# Patient Record
Sex: MALE | Race: WHITE | NOT HISPANIC OR LATINO | Employment: FULL TIME | URBAN - METROPOLITAN AREA
[De-identification: names, ages, dates, MRNs, and addresses within clinical notes are randomized per-mention and may not be internally consistent; named-entity substitution may affect disease eponyms.]

---

## 2017-02-27 ENCOUNTER — OFFICE VISIT (OUTPATIENT)
Dept: FAMILY MEDICINE CLINIC | Facility: CLINIC | Age: 42
End: 2017-02-27

## 2017-02-27 VITALS
HEIGHT: 74 IN | SYSTOLIC BLOOD PRESSURE: 130 MMHG | BODY MASS INDEX: 36.7 KG/M2 | HEART RATE: 81 BPM | WEIGHT: 286 LBS | TEMPERATURE: 98 F | OXYGEN SATURATION: 95 % | DIASTOLIC BLOOD PRESSURE: 90 MMHG

## 2017-02-27 DIAGNOSIS — J40 BRONCHITIS: Primary | ICD-10-CM

## 2017-02-27 PROCEDURE — 99213 OFFICE O/P EST LOW 20 MIN: CPT | Performed by: NURSE PRACTITIONER

## 2017-02-27 RX ORDER — AZITHROMYCIN 250 MG/1
TABLET, FILM COATED ORAL
Qty: 6 TABLET | Refills: 0 | Status: SHIPPED | OUTPATIENT
Start: 2017-02-27 | End: 2017-03-15

## 2017-02-27 RX ORDER — CARVEDILOL 6.25 MG/1
6.25 TABLET ORAL 2 TIMES DAILY WITH MEALS
Qty: 180 TABLET | Refills: 1 | Status: SHIPPED | OUTPATIENT
Start: 2017-02-27 | End: 2017-10-05 | Stop reason: SDUPTHER

## 2017-02-27 NOTE — PROGRESS NOTES
"Subjective   Griffin Samuels is a 42 y.o. male who is here with mostly dry cough and chest congestion. Patient would also like to know if you will refill his blood pressure medicine. He has been out for 3 days now and Dr. Tracy hasn't refilled it for some reason.     History of Present Illness   Congestion and SOA worsening for last 3-4 days, had fever a few nights ago, none since. currently taking otc allergy medication, not helping much. Has not needed inhaler.   Last saw Dr. Tracy several months ago, needs to switch prescriptions to this office. Last blood work 2 weeks ago at Dr. Ramires's office. For testosterone.   The following portions of the patient's history were reviewed and updated as appropriate: allergies, current medications, past family history, past medical history, past social history, past surgical history and problem list.    Review of Systems   Constitutional: Negative for chills and fever.   HENT: Positive for congestion, rhinorrhea, sore throat and voice change. Negative for ear discharge, ear pain, facial swelling, hearing loss, nosebleeds, sinus pressure, sneezing and trouble swallowing.    Respiratory: Positive for cough and shortness of breath. Negative for wheezing.    Cardiovascular: Negative.  Negative for chest pain.   Gastrointestinal: Negative for abdominal pain.   Endocrine: Negative.    Musculoskeletal: Negative for arthralgias.   Allergic/Immunologic: Negative for environmental allergies.   Neurological: Negative for headaches.     Visit Vitals   • /90 (BP Location: Left arm, Patient Position: Sitting, Cuff Size: Large Adult)   • Pulse 81   • Temp 98 °F (36.7 °C) (Oral)   • Ht 74\" (188 cm)   • Wt 286 lb (130 kg)   • SpO2 95%   • BMI 36.72 kg/m2     Objective   Physical Exam   Constitutional: He appears well-developed and well-nourished. No distress.   HENT:   Head: Normocephalic.   Nose: No mucosal edema or rhinorrhea.   Mouth/Throat: Posterior oropharyngeal erythema " present. No oropharyngeal exudate.   Cardiovascular: Normal rate, regular rhythm, normal heart sounds and intact distal pulses.    Pulmonary/Chest: Effort normal and breath sounds normal. No respiratory distress. He has no wheezes. He has no rales.   Lymphadenopathy:     He has cervical adenopathy.   Skin: He is not diaphoretic.   Psychiatric: He has a normal mood and affect. Judgment and thought content normal.   Nursing note and vitals reviewed.    Assessment/Plan   Problems Addressed this Visit     None      Visit Diagnoses     Bronchitis    -  Primary    Relevant Medications    azithromycin (ZITHROMAX) 250 MG tablet        Continue allergy symptom control. FU if not settling 1 week. Reviewed labs from 2016 from leroy Ramires for refill on carvedilol.

## 2017-03-15 ENCOUNTER — OFFICE VISIT (OUTPATIENT)
Dept: FAMILY MEDICINE CLINIC | Facility: CLINIC | Age: 42
End: 2017-03-15

## 2017-03-15 VITALS
TEMPERATURE: 98.1 F | HEART RATE: 97 BPM | BODY MASS INDEX: 36.32 KG/M2 | OXYGEN SATURATION: 95 % | SYSTOLIC BLOOD PRESSURE: 118 MMHG | HEIGHT: 74 IN | WEIGHT: 283 LBS | DIASTOLIC BLOOD PRESSURE: 84 MMHG

## 2017-03-15 DIAGNOSIS — Z87.2 H/O HIDRADENITIS SUPPURATIVA: ICD-10-CM

## 2017-03-15 DIAGNOSIS — IMO0002: Primary | ICD-10-CM

## 2017-03-15 PROCEDURE — 99213 OFFICE O/P EST LOW 20 MIN: CPT | Performed by: NURSE PRACTITIONER

## 2017-03-15 RX ORDER — SULFAMETHOXAZOLE AND TRIMETHOPRIM 800; 160 MG/1; MG/1
1 TABLET ORAL 2 TIMES DAILY
Qty: 14 TABLET | Refills: 0 | Status: SHIPPED | OUTPATIENT
Start: 2017-03-15 | End: 2018-01-10

## 2017-03-15 NOTE — PROGRESS NOTES
"Subjective   Griffin Samuels is a 42 y.o. male who presents with knot on inner right thigh x 6 days. Also with a cough x 3 weeks. Was treated for bronchitis last month.     History of Present Illness   Cough is better overall, unsure if fever other day associated with leg infection or not. Leg has had knot that was larger than golf ball size that drained at noon today. Has had 2 similar knots in the past both in axillas that healed on own  The following portions of the patient's history were reviewed and updated as appropriate: allergies, current medications, past family history, past medical history, past social history, past surgical history and problem list.    Review of Systems   Constitutional: Positive for fever. Negative for chills, fatigue and unexpected weight change.   HENT: Negative.  Negative for congestion.    Respiratory: Positive for cough.    Cardiovascular: Negative.    Gastrointestinal: Negative.    Skin: Positive for wound.   Hematological: Negative.    Psychiatric/Behavioral: Negative.      Visit Vitals   • /84   • Pulse 97   • Temp 98.1 °F (36.7 °C) (Oral)   • Ht 74\" (188 cm)   • Wt 283 lb (128 kg)   • SpO2 95%   • BMI 36.34 kg/m2     Objective   Physical Exam   Constitutional: He appears well-developed and well-nourished.   Lymphadenopathy:        Right: Inguinal adenopathy present.   Skin: Skin is warm. There is erythema.   R inner thigh with 10 x 6 cm cellulitis, indurated, central punctate with minimal serosang discharge.    Psychiatric: He has a normal mood and affect. His behavior is normal. Judgment and thought content normal.   Nursing note and vitals reviewed.    Assessment/Plan   Problems Addressed this Visit     None      Visit Diagnoses     Abscess or cellulitis of leg    -  Primary    Relevant Medications    sulfamethoxazole-trimethoprim (BACTRIM DS,SEPTRA DS) 800-160 MG per tablet    H/O hidradenitis suppurativa              Treated 2/27/17 for bronchitis, now with active " cellulitis, separate issue. Follow up if not settling 1 week, or ASAP any worsening. Consider topical if recurrent cysts

## 2017-10-06 RX ORDER — CARVEDILOL 6.25 MG/1
TABLET ORAL
Qty: 180 TABLET | Refills: 0 | Status: SHIPPED | OUTPATIENT
Start: 2017-10-06 | End: 2018-07-30 | Stop reason: SDUPTHER

## 2017-10-29 DIAGNOSIS — J45.990 EXERCISE-INDUCED ASTHMA: ICD-10-CM

## 2017-11-01 RX ORDER — ALBUTEROL SULFATE 90 MCG
HFA AEROSOL WITH ADAPTER (GRAM) INHALATION
Qty: 6.7 G | Refills: 0 | OUTPATIENT
Start: 2017-11-01

## 2018-01-10 ENCOUNTER — OFFICE VISIT (OUTPATIENT)
Dept: FAMILY MEDICINE CLINIC | Facility: CLINIC | Age: 43
End: 2018-01-10

## 2018-01-10 VITALS
HEART RATE: 77 BPM | DIASTOLIC BLOOD PRESSURE: 72 MMHG | SYSTOLIC BLOOD PRESSURE: 132 MMHG | TEMPERATURE: 98 F | BODY MASS INDEX: 37.47 KG/M2 | OXYGEN SATURATION: 96 % | HEIGHT: 74 IN | WEIGHT: 292 LBS

## 2018-01-10 DIAGNOSIS — Z00.00 ROUTINE GENERAL MEDICAL EXAMINATION AT A HEALTH CARE FACILITY: ICD-10-CM

## 2018-01-10 DIAGNOSIS — R42 VERTIGO: ICD-10-CM

## 2018-01-10 DIAGNOSIS — H66.001 ACUTE SUPPURATIVE OTITIS MEDIA OF RIGHT EAR WITHOUT SPONTANEOUS RUPTURE OF TYMPANIC MEMBRANE, RECURRENCE NOT SPECIFIED: Primary | ICD-10-CM

## 2018-01-10 PROCEDURE — 99213 OFFICE O/P EST LOW 20 MIN: CPT | Performed by: NURSE PRACTITIONER

## 2018-01-10 RX ORDER — OXYCODONE HCL 40 MG/1
30 TABLET, FILM COATED, EXTENDED RELEASE ORAL DAILY
COMMUNITY
Start: 2018-01-02 | End: 2018-07-30 | Stop reason: DRUGHIGH

## 2018-01-10 RX ORDER — CEFDINIR 300 MG/1
CAPSULE ORAL
Qty: 20 CAPSULE | Refills: 0 | Status: SHIPPED | OUTPATIENT
Start: 2018-01-10 | End: 2018-07-26

## 2018-01-10 RX ORDER — MECLIZINE HYDROCHLORIDE 25 MG/1
25 TABLET ORAL 3 TIMES DAILY PRN
Qty: 30 TABLET | Refills: 2 | Status: SHIPPED | OUTPATIENT
Start: 2018-01-10 | End: 2018-07-30

## 2018-01-10 NOTE — PROGRESS NOTES
Subjective   Griffin Samuels is a 43 y.o. male. He had an ear infection last week, he went to the [facility]. He was treated with an antibiotic and that seemed to get better. The last 4 days he is having vertigo. He looked it up online and tried the maneuver for vertigo but that made it much worse.  He is nauseated when he gets the vertigo. He denies sinus problems other than the recent ear infection.  He has not had his labs done in a couple of years so is due for that as well.     Dizziness   This is a new problem. The current episode started in the past 7 days. The problem occurs intermittently. The problem has been gradually worsening. Pertinent negatives include no headaches or visual change. Exacerbated by: change of position. Treatments tried: antibiotic. The treatment provided mild relief.        The following portions of the patient's history were reviewed and updated as appropriate: allergies, current medications, past medical history, past social history, past surgical history and problem list.    Review of Systems   Constitutional: Negative.    HENT:        Recently treated for OM   Respiratory: Negative.    Cardiovascular: Negative.    Neurological: Positive for dizziness and light-headedness. Negative for headaches.       Objective   Physical Exam   Constitutional: Vital signs are normal. He appears well-developed and well-nourished. He is cooperative.   HENT:   Right Ear: Hearing normal. Tympanic membrane is erythematous. A middle ear effusion is present.   Left Ear: Hearing and ear canal normal.   Nose: Nose normal.   Mouth/Throat: Uvula is midline, oropharynx is clear and moist and mucous membranes are normal.   Cardiovascular: Normal rate, regular rhythm and normal heart sounds.    Pulmonary/Chest: Effort normal and breath sounds normal.   Neurological: He is alert.   Nursing note and vitals reviewed.    Vitals:    01/10/18 1447   BP: 132/72   Pulse: 77   Temp: 98 °F (36.7 °C)   TempSrc: Oral  "  SpO2: 96%   Weight: 132 kg (292 lb)   Height: 188 cm (74.02\")       Assessment/Plan   Diagnoses and all orders for this visit:    Acute suppurative otitis media of right ear without spontaneous rupture of tympanic membrane, recurrence not specified  -     cefdinir (OMNICEF) 300 MG capsule; Take one capsule bid x 10 days    Routine general medical examination at a health care facility  -     CBC Auto Differential  -     Comprehensive Metabolic Panel  -     Hemoglobin A1c  -     Lipid Panel  -     TSH    Vertigo  -     meclizine (ANTIVERT) 25 MG tablet; Take 1 tablet by mouth 3 (Three) Times a Day As Needed for dizziness.    RTC if not improved  Use antihistamine          "

## 2018-07-10 ENCOUNTER — TRANSCRIBE ORDERS (OUTPATIENT)
Dept: PHYSICAL THERAPY | Facility: CLINIC | Age: 43
End: 2018-07-10

## 2018-07-10 DIAGNOSIS — M25.562 LEFT KNEE PAIN, UNSPECIFIED CHRONICITY: Primary | ICD-10-CM

## 2018-07-17 ENCOUNTER — TREATMENT (OUTPATIENT)
Dept: PHYSICAL THERAPY | Facility: CLINIC | Age: 43
End: 2018-07-17

## 2018-07-17 DIAGNOSIS — M25.562 ACUTE PAIN OF LEFT KNEE: Primary | ICD-10-CM

## 2018-07-17 PROCEDURE — 97110 THERAPEUTIC EXERCISES: CPT | Performed by: PHYSICAL THERAPIST

## 2018-07-17 PROCEDURE — 97001 PR PHYS THERAPY EVALUATION: CPT | Performed by: PHYSICAL THERAPIST

## 2018-07-17 PROCEDURE — 97014 ELECTRIC STIMULATION THERAPY: CPT | Performed by: PHYSICAL THERAPIST

## 2018-07-17 NOTE — PROGRESS NOTES
Physical Therapy Initial Evaluation and Plan of Care        Subjective Evaluation    History of Present Illness  Mechanism of injury: Patient reports that he was pulling a can and twisted his knee.  He reports that this knee continues to get worse and is swelling more and more.  Is having a a hard time putting his full weight on his left LE and uses crutches when having to walk any significant distance (patient arrived without crutches however reports that if it was any further than the 100 feet he would have brought them in).  He is having trouble sleeping and has been attempting to ice as often as possible.    MRI is already scheduled      Patient Occupation: [employer] Quality of life: excellent    Pain  Quality: throbbing, squeezing and pulling  Relieving factors: medications and ice  Aggravating factors: squatting, ambulation, stairs, prolonged positioning, repetitive movement, sleeping, standing and movement  Progression: worsening    Treatments  Previous treatment: medication  Patient Goals  Patient goals for therapy: return to work, return to sport/leisure activities, increased strength, decreased edema, decreased pain and increased motion             Objective       Tenderness   Left Knee   Tenderness in the patellar tendon and quadriceps tendon.     Active Range of Motion   Left Knee   Flexion: 50 degrees   Extension: 0 degrees with pain    Additional Active Range of Motion Details  *Measurements taken in supine.     Tests     Additional Tests Details  Special tests no performed secondary to pain.          Assessment & Plan     Assessment  Impairments: abnormal or restricted ROM, activity intolerance, lacks appropriate home exercise program and pain with function  Assessment details: Patient is a 43 year old male who present with increased left knee pain.  Upon examination he has significant limitation in left knee AROM. MMT and special testing were not performed secondary to pain.  Increased tenderness at  patellar and quad tendon.    Prognosis: good  Prognosis details: Long Term Goals (4 weeks)    Patient is able to return to work/community activities with minimal to no discomfort  Patient is able to lift 45 lbs from floor to waist  Patient is able stand for as long as needed for work/community related tasks.   Patient is able to sit for as long as needed for work/community related tasks.   Patient is able to reciprocally climb steps as needed for daily tasks.          Short Term Goals 2 weeks    Patient is independent with HEP  Patient is able to sleep without being disrupted by pain.   Patient has full AROM/PROM of left knee  Patient has greater than 50% improvement overall.                 Functional Limitations: sleeping, walking and standing  Plan  Therapy options: will be seen for skilled physical therapy services  Planned modality interventions: TENS, ultrasound, thermotherapy (hydrocollator packs) and cryotherapy  Planned therapy interventions: manual therapy, soft tissue mobilization, strengthening, stretching, therapeutic activities, joint mobilization, home exercise program, functional ROM exercises, flexibility and body mechanics training  Treatment plan discussed with: patient          Therapeutic Exercise:    25     mins  [id number];       Timed Treatment:   25   mins   Total Treatment:     55   mins    PT SIGNATURE: Tracy Storm, PT   DATE TREATMENT INITIATED: 7/17/2018    Initial Certification  Certification Period: 10/15/2018  I certify that the therapy services are furnished while this patient is under my care.  The services outlined above are required by this patient, and will be reviewed every 90 days.     PHYSICIAN: Araceli Gray, VERA      DATE:     Please sign and return via fax to [phone].. Thank you, [facility].

## 2018-07-19 ENCOUNTER — TREATMENT (OUTPATIENT)
Dept: PHYSICAL THERAPY | Facility: CLINIC | Age: 43
End: 2018-07-19

## 2018-07-19 DIAGNOSIS — M25.562 ACUTE PAIN OF LEFT KNEE: Primary | ICD-10-CM

## 2018-07-19 PROCEDURE — 97110 THERAPEUTIC EXERCISES: CPT | Performed by: PHYSICAL THERAPIST

## 2018-07-19 PROCEDURE — 97014 ELECTRIC STIMULATION THERAPY: CPT | Performed by: PHYSICAL THERAPIST

## 2018-07-19 NOTE — PROGRESS NOTES
Re-Assessment / Re-Certification        Patient: Griffin Samuels   : 1975  Diagnosis/ICD-10 Code:  Acute pain of left knee [M25.562]  Referring practitioner: Araceli Gray,*  Patient seen for 2 sessions      Subjective:   Griffin Samuels reports: Patient reports that he continues to have increased left knee pain and swelling.  States that the exercises have helped with the calf cramping however otherwise minimal to no results.     Home Program Compliance: Yes  Treatment has included: therapeutic exercise and electrical stimulation    Subjective Evaluation    Pain  Aggravating factors: squatting, ambulation, prolonged positioning, repetitive movement, sleeping, movement, stairs and standing  Progression: worsening    Patient Goals  Patient goals for therapy: return to work, return to sport/leisure activities, increased strength, decreased edema, decreased pain and increased motion         Objective       Tenderness   Left Knee   Tenderness in the patellar tendon and quadriceps tendon.     Active Range of Motion   Left Knee   Flexion: 50 degrees   Extension: 0 degrees with pain    Additional Active Range of Motion Details  *Measurements taken in supine.     Tests     Additional Tests Details  Special tests no performed secondary to pain.      Assessment & Plan     Assessment  Assessment details: Patient has attended 2 physical therapy appointments thus far.  He has had minimal progress and continues to have significantly limited AROM in both sitting and supine (approximatly 50 degrees).  Does have full extension however painful.  Patient has pain inhibited MMT.  Special testing not tested due to limited AROM.  Tenderness to palpation along medial joint line and medial inferior to patella with mild swelling noted.  Patient gait is compromised with limited stance time.     Plan  Plan details: Please advise.       Progress toward previous goals: Not Met      PT Signature: Tracy Storm, PT      Based  upon review of the patient's progress and continued therapy plan, it is my medical opinion that Griffin Samuels should continue physical therapy treatment at [facility]  [address] [place] [id number] [phone].    Signature: __________________________________  VERA Stanley      Therapeutic Exercise:    12     mins  [id number];       Timed Treatment:   12   mins   Total Treatment:     27   mins

## 2018-07-24 ENCOUNTER — TREATMENT (OUTPATIENT)
Dept: PHYSICAL THERAPY | Facility: CLINIC | Age: 43
End: 2018-07-24

## 2018-07-24 DIAGNOSIS — M25.562 ACUTE PAIN OF LEFT KNEE: Primary | ICD-10-CM

## 2018-07-24 PROCEDURE — 97014 ELECTRIC STIMULATION THERAPY: CPT | Performed by: PHYSICAL THERAPIST

## 2018-07-24 PROCEDURE — 97110 THERAPEUTIC EXERCISES: CPT | Performed by: PHYSICAL THERAPIST

## 2018-07-24 PROCEDURE — 97035 APP MDLTY 1+ULTRASOUND EA 15: CPT | Performed by: PHYSICAL THERAPIST

## 2018-07-24 NOTE — PROGRESS NOTES
Physical Therapy Daily Progress Note            Subjective Evaluation    History of Present Illness    Subjective comment: Patient reports that the pain continues to intensify and is not wrapping around the knee.  Still waiting to hear about his orthopedic appointment.        Objective   See Exercise, Manual, and Modality Logs for complete treatment.       Assessment & Plan     Assessment  Assessment details: Patient continues to have considerable pain with low level exercises. ROM significantly limited.                        Therapeutic Exercise:    15     mins  [id number];     Ultrasound:     8     mins  [id number];      Timed Treatment:   23   mins   Total Treatment:     38   mins    Tracy Storm, PT  Physical Therapist

## 2018-07-26 ENCOUNTER — HOSPITAL ENCOUNTER (EMERGENCY)
Facility: HOSPITAL | Age: 43
Discharge: HOME | End: 2018-07-26
Attending: EMERGENCY MEDICINE | Admitting: EMERGENCY MEDICINE

## 2018-07-26 ENCOUNTER — TREATMENT (OUTPATIENT)
Dept: PHYSICAL THERAPY | Facility: CLINIC | Age: 43
End: 2018-07-26

## 2018-07-26 VITALS
BODY MASS INDEX: 33.37 KG/M2 | TEMPERATURE: 98.1 F | HEIGHT: 74 IN | HEART RATE: 87 BPM | DIASTOLIC BLOOD PRESSURE: 95 MMHG | SYSTOLIC BLOOD PRESSURE: 142 MMHG | WEIGHT: 260 LBS | OXYGEN SATURATION: 98 % | RESPIRATION RATE: 16 BRPM

## 2018-07-26 DIAGNOSIS — M25.562 ACUTE PAIN OF LEFT KNEE: Primary | ICD-10-CM

## 2018-07-26 DIAGNOSIS — S83.92XA SPRAIN OF LEFT KNEE, UNSPECIFIED LIGAMENT, INITIAL ENCOUNTER: Primary | ICD-10-CM

## 2018-07-26 PROCEDURE — 97110 THERAPEUTIC EXERCISES: CPT | Performed by: PHYSICAL THERAPIST

## 2018-07-26 PROCEDURE — 99282 EMERGENCY DEPT VISIT SF MDM: CPT

## 2018-07-26 PROCEDURE — 97014 ELECTRIC STIMULATION THERAPY: CPT | Performed by: PHYSICAL THERAPIST

## 2018-07-26 NOTE — PROGRESS NOTES
Physical Therapy Daily Progress Note            Subjective Evaluation    History of Present Illness    Subjective comment: Patient reports that he is having signifiant right hip pain from having to walk the distance needed from the parking lot to his station at work.  He states that his knee is about the same.        Objective   See Exercise, Manual, and Modality Logs for complete treatment.       Assessment & Plan     Assessment  Assessment details: Patient presented to the clinic with an antalgic gait pattern which is progressively worse then previous visits.  He has limited weight bearing on left LE with right leaning. Introduced moist heat to low back/right hip as well as SKTC and piriformis for increased muscle guarding. Patient tolerated fairly well with some decrease in pain.  Discussed circumstances with referring provider  who suggested that if his pain is elevated that he should proceed to ER following PT where more diagnostics/medication could be administered.   Referral to orthopedics is still pending.                              Therapeutic Exercise:    15     mins  [id number];       Timed Treatment:   15   mins   Total Treatment:     30   mins    Tracy Storm, PT  Physical Therapist

## 2018-07-30 ENCOUNTER — TELEPHONE (OUTPATIENT)
Dept: SOCIAL WORK | Facility: HOSPITAL | Age: 43
End: 2018-07-30

## 2018-07-30 ENCOUNTER — OFFICE VISIT (OUTPATIENT)
Dept: FAMILY MEDICINE CLINIC | Facility: CLINIC | Age: 43
End: 2018-07-30

## 2018-07-30 VITALS
TEMPERATURE: 98.2 F | HEIGHT: 74 IN | BODY MASS INDEX: 34.39 KG/M2 | WEIGHT: 268 LBS | SYSTOLIC BLOOD PRESSURE: 110 MMHG | OXYGEN SATURATION: 98 % | HEART RATE: 89 BPM | DIASTOLIC BLOOD PRESSURE: 78 MMHG

## 2018-07-30 DIAGNOSIS — E66.09 CLASS 1 OBESITY DUE TO EXCESS CALORIES WITHOUT SERIOUS COMORBIDITY WITH BODY MASS INDEX (BMI) OF 34.0 TO 34.9 IN ADULT: ICD-10-CM

## 2018-07-30 DIAGNOSIS — Z00.00 ROUTINE GENERAL MEDICAL EXAMINATION AT A HEALTH CARE FACILITY: ICD-10-CM

## 2018-07-30 DIAGNOSIS — E78.5 HYPERLIPIDEMIA, UNSPECIFIED HYPERLIPIDEMIA TYPE: ICD-10-CM

## 2018-07-30 DIAGNOSIS — I10 ESSENTIAL HYPERTENSION: Primary | ICD-10-CM

## 2018-07-30 PROCEDURE — 99213 OFFICE O/P EST LOW 20 MIN: CPT | Performed by: NURSE PRACTITIONER

## 2018-07-30 RX ORDER — OXYCODONE HYDROCHLORIDE 30 MG/1
30 TABLET ORAL 2 TIMES DAILY
COMMUNITY

## 2018-07-30 RX ORDER — ATORVASTATIN CALCIUM 10 MG/1
10 TABLET, FILM COATED ORAL DAILY
Qty: 90 TABLET | Refills: 1 | Status: SHIPPED | OUTPATIENT
Start: 2018-07-30 | End: 2019-04-08

## 2018-07-30 RX ORDER — CARVEDILOL 6.25 MG/1
6.25 TABLET ORAL 2 TIMES DAILY WITH MEALS
Qty: 180 TABLET | Refills: 1 | Status: SHIPPED | OUTPATIENT
Start: 2018-07-30 | End: 2019-02-25 | Stop reason: SDUPTHER

## 2018-07-30 RX ORDER — OXYCODONE HCL 10 MG/1
10 TABLET, FILM COATED, EXTENDED RELEASE ORAL EVERY 12 HOURS
COMMUNITY

## 2018-07-30 NOTE — PROGRESS NOTES
Subjective   Griffin Samuels is a 43 y.o. male who is here for a check up on his blood pressure and cholesterol. Was doing really well until he hurt his left knee at work. Has appt to see someone at [facility] on Thurs. Now can't really exercise but is trying to watch closely what he eats. He doesn't eat after 5 pm. Drinks a lot of water now. No longer on the testosterone injections but would like to get his PSA checked. He is currently weaning down from the pain medication per Dr. Motley. Wants to trial what it would be without the medication. May not be able to stop taking it because he has lots of hardware in his back from surgery.   Hypertension: Well controlled. Continues with Carvedilol twice daily. Needs refills.  Hyperlipidemia: Controlled. Continues with medication. Needs refills and labs.    Hypertension   This is a chronic problem. The current episode started more than 1 year ago. The problem is unchanged. The problem is controlled. Pertinent negatives include no chest pain, palpitations, peripheral edema or shortness of breath. There are no associated agents to hypertension. Risk factors for coronary artery disease include family history, dyslipidemia, male gender and obesity. Past treatments include beta blockers. Current antihypertension treatment includes beta blockers. The current treatment provides significant improvement. There are no compliance problems.    Hyperlipidemia   This is a chronic problem. The current episode started more than 1 year ago. The problem is controlled. Recent lipid tests were reviewed and are variable. There are no known factors aggravating his hyperlipidemia. Pertinent negatives include no chest pain or shortness of breath. Current antihyperlipidemic treatment includes exercise, diet change and statins. The current treatment provides significant improvement of lipids. There are no compliance problems.  Risk factors for coronary artery disease include  "dyslipidemia, family history, hypertension, male sex and obesity.        The following portions of the patient's history were reviewed and updated as appropriate: allergies, current medications, past family history, past medical history, past social history, past surgical history and problem list.    Review of Systems   Constitutional: Negative.    Respiratory: Negative.  Negative for shortness of breath.    Cardiovascular: Negative for chest pain and palpitations.        Hypertension  Hyperlipidemia     Musculoskeletal:        Left knee injury       Objective   Physical Exam   Constitutional: Vital signs are normal. He appears well-developed and well-nourished. He is cooperative.   Neck: Carotid bruit is not present. No thyroid mass and no thyromegaly present.   Cardiovascular: Normal rate, regular rhythm, normal heart sounds, intact distal pulses and normal pulses.    Pulmonary/Chest: Effort normal and breath sounds normal.   Neurological: He is alert.   Psychiatric: He has a normal mood and affect. His speech is normal and behavior is normal. Judgment and thought content normal. Cognition and memory are normal.   Nursing note and vitals reviewed.    Vitals:    07/30/18 1322   BP: 110/78   BP Location: Right arm   Patient Position: Sitting   Cuff Size: Large Adult   Pulse: 89   Temp: 98.2 °F (36.8 °C)   TempSrc: Oral   SpO2: 98%   Weight: 122 kg (268 lb)   Height: 188 cm (74\")       Assessment/Plan   Griffin was seen today for hyperlipidemia and hypertension.    Diagnoses and all orders for this visit:    Essential hypertension  -     CBC & Differential  -     carvedilol (COREG) 6.25 MG tablet; Take 1 tablet by mouth 2 (Two) Times a Day With Meals.    Hyperlipidemia, unspecified hyperlipidemia type  -     Comprehensive Metabolic Panel  -     Lipid Panel  -     atorvastatin (LIPITOR) 10 MG tablet; Take 1 tablet by mouth Daily.    Routine general medical examination at a health care facility  -     CBC & " Differential  -     Comprehensive Metabolic Panel  -     Hemoglobin A1c  -     Lipid Panel  -     Vitamin D 25 Hydroxy  -     PSA, Total & Free    Class 1 obesity due to excess calories without serious comorbidity with body mass index (BMI) of 34.0 to 34.9 in adult      Continue healthy lifestyle changes.  Medications as before. Refills sent.   Will come back for fasting labs.  I will call results.   F/U 6 months.

## 2018-07-30 NOTE — TELEPHONE ENCOUNTER
Spoke with pt today in f/u and he states he is feeling better and the swelling of his knee has gone down after using ice and elevating his leg. Pt also states the he has a f/u with Dr. Gross for this coming Thursday. No other questions voiced by pt at this time. Saray ROBBINS

## 2018-09-25 ENCOUNTER — DOCUMENTATION (OUTPATIENT)
Dept: PHYSICAL THERAPY | Facility: CLINIC | Age: 43
End: 2018-09-25

## 2019-01-16 ENCOUNTER — OFFICE VISIT (OUTPATIENT)
Dept: FAMILY MEDICINE CLINIC | Facility: CLINIC | Age: 44
End: 2019-01-16

## 2019-01-16 VITALS
DIASTOLIC BLOOD PRESSURE: 68 MMHG | SYSTOLIC BLOOD PRESSURE: 118 MMHG | BODY MASS INDEX: 36.57 KG/M2 | TEMPERATURE: 97.9 F | WEIGHT: 285 LBS | HEART RATE: 78 BPM | OXYGEN SATURATION: 95 % | HEIGHT: 74 IN

## 2019-01-16 DIAGNOSIS — H66.002 ACUTE SUPPURATIVE OTITIS MEDIA OF LEFT EAR WITHOUT SPONTANEOUS RUPTURE OF TYMPANIC MEMBRANE, RECURRENCE NOT SPECIFIED: ICD-10-CM

## 2019-01-16 DIAGNOSIS — H81.12 BENIGN PAROXYSMAL POSITIONAL VERTIGO OF LEFT EAR: ICD-10-CM

## 2019-01-16 DIAGNOSIS — M25.562 ACUTE PAIN OF LEFT KNEE: Primary | ICD-10-CM

## 2019-01-16 PROCEDURE — 73560 X-RAY EXAM OF KNEE 1 OR 2: CPT | Performed by: NURSE PRACTITIONER

## 2019-01-16 PROCEDURE — 99213 OFFICE O/P EST LOW 20 MIN: CPT | Performed by: NURSE PRACTITIONER

## 2019-01-16 RX ORDER — MECLIZINE HYDROCHLORIDE 25 MG/1
25 TABLET ORAL 3 TIMES DAILY PRN
Qty: 21 TABLET | Refills: 0 | Status: SHIPPED | OUTPATIENT
Start: 2019-01-16 | End: 2019-04-08

## 2019-01-16 RX ORDER — CEFDINIR 300 MG/1
300 CAPSULE ORAL 2 TIMES DAILY
Qty: 14 CAPSULE | Refills: 0 | Status: SHIPPED | OUTPATIENT
Start: 2019-01-16 | End: 2019-01-29 | Stop reason: SDUPTHER

## 2019-01-16 NOTE — PROGRESS NOTES
"Subjective   Griffin Samuels is a 44 y.o. male who presents c/o dizziness x 1 week.     History of Present Illness   Had similar episode about 1 yr ago, that took meclizine to settle. Fell, landed on knee 4 days ago. Very painful   Does tend to ear infections, had 1 about a month ago that took 2 antibiotics to resolve.   The following portions of the patient's history were reviewed and updated as appropriate: allergies, current medications, past family history, past medical history, past social history, past surgical history and problem list.    Review of Systems   Constitutional: Positive for fatigue. Negative for chills and fever.   HENT: Negative for congestion, ear pain, rhinorrhea and sore throat.    Respiratory: Negative.    Cardiovascular: Negative.    Musculoskeletal: Positive for arthralgias.   Neurological: Positive for dizziness (worse with position changes).   Psychiatric/Behavioral: Negative.      /68   Pulse 78   Temp 97.9 °F (36.6 °C) (Oral)   Ht 188 cm (74\")   Wt 129 kg (285 lb)   SpO2 95%   BMI 36.59 kg/m²     Objective   Physical Exam   Constitutional: He appears well-developed and well-nourished.   HENT:   Right Ear: Tympanic membrane normal.   Left Ear: Tympanic membrane is bulging. A middle ear effusion is present.   Nose: Nose normal. Right sinus exhibits no maxillary sinus tenderness and no frontal sinus tenderness. Left sinus exhibits no maxillary sinus tenderness and no frontal sinus tenderness.   Cardiovascular: Normal rate, regular rhythm and normal heart sounds.   Pulmonary/Chest: Effort normal and breath sounds normal.   Musculoskeletal:        Left knee: He exhibits decreased range of motion, effusion and bony tenderness (tibial tuberosity). He exhibits normal meniscus and no MCL laxity.   Neurological:   ino umana pike +L   Nursing note and vitals reviewed.    Assessment/Plan   Problems Addressed this Visit     None      Visit Diagnoses     Acute pain of left knee    -  " Primary    Relevant Orders    XR Knee 1 or 2 View Left (In Office)    Acute suppurative otitis media of left ear without spontaneous rupture of tympanic membrane, recurrence not specified        Benign paroxysmal positional vertigo of left ear            L knee pain, tender tuberosity, will xray. XRAY today NAD, loose body posterior, no comparison. Will send to radiology for opinion. Would ice to address the pain and effusion.  AOM--omnicef, follow up if symptoms not settling, 1 week  BPPV--likely flared secondary to AOM, will treat, rotate antibiotics, follow up if not settling 1 week.

## 2019-01-18 ENCOUNTER — TELEPHONE (OUTPATIENT)
Dept: FAMILY MEDICINE CLINIC | Facility: CLINIC | Age: 44
End: 2019-01-18

## 2019-01-18 NOTE — TELEPHONE ENCOUNTER
Patient says the pain in his knee has not changed. He wants to know if he should proceed with MRI now or how long he should give it before doing this?

## 2019-01-18 NOTE — TELEPHONE ENCOUNTER
----- Message from VERA Rubalcava sent at 1/18/2019  3:32 PM EST -----  Radiology read moderate arthritis changes in the knee, if pain not settling, would consider MRI

## 2019-01-29 DIAGNOSIS — H66.002 ACUTE SUPPURATIVE OTITIS MEDIA OF LEFT EAR WITHOUT SPONTANEOUS RUPTURE OF TYMPANIC MEMBRANE, RECURRENCE NOT SPECIFIED: ICD-10-CM

## 2019-01-31 RX ORDER — CEFDINIR 300 MG/1
CAPSULE ORAL
Qty: 14 CAPSULE | Refills: 0 | Status: SHIPPED | OUTPATIENT
Start: 2019-01-31 | End: 2019-04-08

## 2019-01-31 NOTE — TELEPHONE ENCOUNTER
Patient states he was seen 1-16-19 and still has congestion and pressure. He is asking for a refill on this.

## 2019-02-25 DIAGNOSIS — I10 ESSENTIAL HYPERTENSION: ICD-10-CM

## 2019-02-25 RX ORDER — CARVEDILOL 6.25 MG/1
6.25 TABLET ORAL 2 TIMES DAILY WITH MEALS
Qty: 180 TABLET | Refills: 0 | Status: SHIPPED | OUTPATIENT
Start: 2019-02-25 | End: 2019-05-16 | Stop reason: SDUPTHER

## 2019-03-13 ENCOUNTER — TELEPHONE (OUTPATIENT)
Dept: FAMILY MEDICINE CLINIC | Facility: CLINIC | Age: 44
End: 2019-03-13

## 2019-03-13 DIAGNOSIS — E78.5 DYSLIPIDEMIA: Primary | ICD-10-CM

## 2019-03-13 NOTE — TELEPHONE ENCOUNTER
Pt hasn't had labs since 2016 and I am unsure what you would want to order based on what was order last time. Please advise what to order and dx.     FYI - Pt has appt with you on 4/8/19.

## 2019-04-02 LAB
ALBUMIN SERPL-MCNC: 5.1 G/DL (ref 3.5–5.2)
ALBUMIN/GLOB SERPL: 2 G/DL
ALP SERPL-CCNC: 83 U/L (ref 39–117)
ALT SERPL-CCNC: 56 U/L (ref 1–41)
AST SERPL-CCNC: 35 U/L (ref 1–40)
BILIRUB SERPL-MCNC: 0.2 MG/DL (ref 0.2–1.2)
BUN SERPL-MCNC: 12 MG/DL (ref 6–20)
BUN/CREAT SERPL: 12.5 (ref 7–25)
CALCIUM SERPL-MCNC: 10.1 MG/DL (ref 8.6–10.5)
CHLORIDE SERPL-SCNC: 104 MMOL/L (ref 98–107)
CHOLEST SERPL-MCNC: 207 MG/DL (ref 0–200)
CO2 SERPL-SCNC: 25.2 MMOL/L (ref 22–29)
CREAT SERPL-MCNC: 0.96 MG/DL (ref 0.76–1.27)
GLOBULIN SER CALC-MCNC: 2.6 GM/DL
GLUCOSE SERPL-MCNC: 102 MG/DL (ref 65–99)
HDLC SERPL-MCNC: 45 MG/DL (ref 40–60)
LDLC SERPL CALC-MCNC: 135 MG/DL (ref 0–100)
POTASSIUM SERPL-SCNC: 4.8 MMOL/L (ref 3.5–5.2)
PROT SERPL-MCNC: 7.7 G/DL (ref 6–8.5)
SODIUM SERPL-SCNC: 138 MMOL/L (ref 136–145)
TRIGL SERPL-MCNC: 134 MG/DL (ref 0–150)
VLDLC SERPL CALC-MCNC: 26.8 MG/DL

## 2019-04-08 ENCOUNTER — OFFICE VISIT (OUTPATIENT)
Dept: FAMILY MEDICINE CLINIC | Facility: CLINIC | Age: 44
End: 2019-04-08

## 2019-04-08 VITALS
OXYGEN SATURATION: 97 % | BODY MASS INDEX: 36.46 KG/M2 | SYSTOLIC BLOOD PRESSURE: 114 MMHG | DIASTOLIC BLOOD PRESSURE: 78 MMHG | WEIGHT: 284 LBS | HEART RATE: 71 BPM | TEMPERATURE: 97.8 F

## 2019-04-08 DIAGNOSIS — E78.5 HYPERLIPIDEMIA, UNSPECIFIED HYPERLIPIDEMIA TYPE: ICD-10-CM

## 2019-04-08 DIAGNOSIS — F41.9 ANXIETY: ICD-10-CM

## 2019-04-08 DIAGNOSIS — I10 ESSENTIAL HYPERTENSION: Primary | ICD-10-CM

## 2019-04-08 PROCEDURE — 99214 OFFICE O/P EST MOD 30 MIN: CPT | Performed by: FAMILY MEDICINE

## 2019-04-08 RX ORDER — BUPROPION HYDROCHLORIDE 150 MG/1
150 TABLET ORAL DAILY
Qty: 30 TABLET | Refills: 5 | Status: SHIPPED | OUTPATIENT
Start: 2019-04-08 | End: 2019-05-20 | Stop reason: SDUPTHER

## 2019-04-08 NOTE — PROGRESS NOTES
Subjective   Griffin Samuels is a 44 y.o. male. Presents today for   Chief Complaint   Patient presents with   • Follow-up     having anxiety and was on buproprion before.  med check   • Hypertension   • Hyperlipidemia       Preappt labs completed.  Hypertension   This is a chronic problem. The current episode started more than 1 year ago. The problem is unchanged. The problem is controlled. Associated symptoms include anxiety. Pertinent negatives include no chest pain, orthopnea, palpitations, peripheral edema, PND or shortness of breath. There are no associated agents to hypertension. Risk factors for coronary artery disease include dyslipidemia and male gender. Past treatments include beta blockers. Current antihypertension treatment includes beta blockers. The current treatment provides moderate improvement. There is no history of kidney disease, CAD/MI, CVA, heart failure or PVD. There is no history of chronic renal disease.   Hyperlipidemia   This is a chronic problem. The current episode started more than 1 year ago. The problem is uncontrolled. Recent lipid tests were reviewed and are high. He has no history of chronic renal disease. Factors aggravating his hyperlipidemia include beta blockers. Pertinent negatives include no chest pain or shortness of breath. He is currently on no antihyperlipidemic treatment. The current treatment provides moderate (for some reason lipitor not filled) improvement of lipids. Risk factors for coronary artery disease include hypertension, male sex and dyslipidemia.   Anxiety   Presents for follow-up visit. Symptoms include decreased concentration, excessive worry, muscle tension and nervous/anxious behavior. Patient reports no chest pain, palpitations, shortness of breath or suicidal ideas. Symptoms occur most days. The severity of symptoms is severe.     Compliance with medications: not currently on treatment, was on wellbutrin with relief.       Review of Systems    Respiratory: Negative for shortness of breath.    Cardiovascular: Negative for chest pain, palpitations, orthopnea and PND.   Psychiatric/Behavioral: Positive for decreased concentration. Negative for suicidal ideas. The patient is nervous/anxious.        Patient Active Problem List   Diagnosis   • Hyperlipidemia   • Essential hypertension   • Osteoarthritis of multiple joints   • Hypogonadism in male   • Obesity due to excess calories   • History of arthrodesis   • Chronic low back pain       Social History     Socioeconomic History   • Marital status:      Spouse name: Not on file   • Number of children: Not on file   • Years of education: Not on file   • Highest education level: Not on file   Tobacco Use   • Smoking status: Never Smoker   • Smokeless tobacco: Never Used   Substance and Sexual Activity   • Alcohol use: No   • Drug use: No   • Sexual activity: Yes     Partners: Female     Birth control/protection: None       No Known Allergies    Current Outpatient Medications on File Prior to Visit   Medication Sig Dispense Refill   • albuterol (PROVENTIL HFA;VENTOLIN HFA) 108 (90 BASE) MCG/ACT inhaler 2 puffs prior to exercise and every 6 hours as needed. 6.7 g 11   • atorvastatin (LIPITOR) 10 MG tablet Take 1 tablet by mouth Daily. 90 tablet 1   • carvedilol (COREG) 6.25 MG tablet Take 1 tablet by mouth 2 (Two) Times a Day With Meals. 180 tablet 0   • diclofenac sodium (VOTAREN XR) 100 MG 24 hr tablet Take 100 mg by mouth Daily.     • oxyCODONE (oxyCONTIN) 10 MG 12 hr tablet Take 10 mg by mouth Every 12 (Twelve) Hours.     • oxyCODONE (ROXICODONE) 30 MG immediate release tablet Take 30 mg by mouth 2 (Two) Times a Day.     • [DISCONTINUED] meclizine (ANTIVERT) 25 MG tablet Take 1 tablet by mouth 3 (Three) Times a Day As Needed for dizziness. 21 tablet 0   • [DISCONTINUED] cefdinir (OMNICEF) 300 MG capsule TAKE 1 CAPSULE BY MOUTH TWICE DAILY 14 capsule 0     No current facility-administered medications on  file prior to visit.        Objective   Vitals:    04/08/19 1403   BP: 114/78   Pulse: 71   Temp: 97.8 °F (36.6 °C)   SpO2: 97%   Weight: 129 kg (284 lb)       Physical Exam   Constitutional: He appears well-developed and well-nourished.   HENT:   Head: Normocephalic and atraumatic.   Neck: Neck supple. No JVD present. No thyromegaly present.   Cardiovascular: Normal rate, regular rhythm and normal heart sounds. Exam reveals no gallop and no friction rub.   No murmur heard.  Pulmonary/Chest: Effort normal and breath sounds normal. No respiratory distress. He has no wheezes. He has no rales.   Abdominal: Soft. Bowel sounds are normal. He exhibits no distension. There is no tenderness. There is no rebound and no guarding.   Musculoskeletal: He exhibits no edema.   Neurological: He is alert.   Skin: Skin is warm and dry.   Psychiatric: He has a normal mood and affect. His behavior is normal.   Nursing note and vitals reviewed.      Component      Latest Ref Rng & Units 4/1/2019   Glucose      65 - 99 mg/dL 102 (H)   BUN      6 - 20 mg/dL 12   Creatinine      0.76 - 1.27 mg/dL 0.96   eGFR Non African Am      >60 mL/min/1.73 84   eGFR African Am      >60 mL/min/1.73 102   BUN/Creatinine Ratio      7.0 - 25.0 12.5   Sodium      136 - 145 mmol/L 138   Potassium      3.5 - 5.2 mmol/L 4.8   Chloride      98 - 107 mmol/L 104   CO2      22.0 - 29.0 mmol/L 25.2   Calcium      8.6 - 10.5 mg/dL 10.1   Total Protein      6.0 - 8.5 g/dL 7.7   Albumin      3.50 - 5.20 g/dL 5.10   Globulin      gm/dL 2.6   A/G Ratio      g/dL 2.0   Total Bilirubin      0.2 - 1.2 mg/dL 0.2   Alkaline Phosphatase      39 - 117 U/L 83   AST (SGOT)      1 - 40 U/L 35   ALT (SGPT)      1 - 41 U/L 56 (H)   Total Cholesterol      0 - 200 mg/dL 207 (H)   Triglycerides      0 - 150 mg/dL 134   HDL Cholesterol      40 - 60 mg/dL 45   VLDL Cholesterol      mg/dL 26.8   LDL Cholesterol       0 - 100 mg/dL 135 (H)     Assessment/Plan   Problems Addressed this  Visit        Cardiovascular and Mediastinum    Hyperlipidemia    Essential hypertension - Primary      Other Visit Diagnoses     Anxiety        Relevant Medications    buPROPion XL (WELLBUTRIN XL) 150 MG 24 hr tablet          -hypertension - controlled, continue medications  -anxiety - seek care if thoughts of self harm;  Reports did well with wellbutrin, d/w more for depression but since on and tolerated will retry.  Consider buspar  -hld - off statin, levels not too bad;  Denies fam hx of cardiac issues will hold off restarting and counseled on diet and exercise;  Recheck in 6 monhts       -Follow up: 6 weeks and prn

## 2019-05-16 DIAGNOSIS — I10 ESSENTIAL HYPERTENSION: ICD-10-CM

## 2019-05-16 RX ORDER — CARVEDILOL 6.25 MG/1
TABLET ORAL
Qty: 180 TABLET | Refills: 0 | Status: SHIPPED | OUTPATIENT
Start: 2019-05-16 | End: 2019-08-23 | Stop reason: SDUPTHER

## 2019-05-20 ENCOUNTER — OFFICE VISIT (OUTPATIENT)
Dept: FAMILY MEDICINE CLINIC | Facility: CLINIC | Age: 44
End: 2019-05-20

## 2019-05-20 VITALS
HEART RATE: 66 BPM | BODY MASS INDEX: 36.98 KG/M2 | OXYGEN SATURATION: 97 % | SYSTOLIC BLOOD PRESSURE: 122 MMHG | WEIGHT: 288 LBS | DIASTOLIC BLOOD PRESSURE: 80 MMHG | TEMPERATURE: 98.5 F

## 2019-05-20 DIAGNOSIS — F41.9 ANXIETY: Primary | ICD-10-CM

## 2019-05-20 PROCEDURE — 99213 OFFICE O/P EST LOW 20 MIN: CPT | Performed by: FAMILY MEDICINE

## 2019-05-20 RX ORDER — BUPROPION HYDROCHLORIDE 300 MG/1
300 TABLET ORAL DAILY
Qty: 30 TABLET | Refills: 5 | Status: SHIPPED | OUTPATIENT
Start: 2019-05-20 | End: 2019-09-09 | Stop reason: HOSPADM

## 2019-05-20 NOTE — PROGRESS NOTES
Subjective   Griffin Samuels is a 44 y.o. male. Presents today for   Chief Complaint   Patient presents with   • Follow-up     check on bupropion       Anxiety   Presents for follow-up visit. Symptoms include excessive worry and nervous/anxious behavior. Patient reports no chest pain, depressed mood, palpitations or shortness of breath. Symptoms occur occasionally. The severity of symptoms is mild. The quality of sleep is fair. Nighttime awakenings: occasional.     Compliance with medications is %. Treatment side effects: started wellbutrin last ov and doing well wtih no side effets.  Feels he needs to go up on dose.       Review of Systems   Respiratory: Negative for shortness of breath.    Cardiovascular: Negative for chest pain and palpitations.   Psychiatric/Behavioral: The patient is nervous/anxious.        Patient Active Problem List   Diagnosis   • Hyperlipidemia   • Essential hypertension   • Osteoarthritis of multiple joints   • Hypogonadism in male   • Obesity due to excess calories   • History of arthrodesis   • Chronic low back pain       Social History     Socioeconomic History   • Marital status:      Spouse name: Not on file   • Number of children: Not on file   • Years of education: Not on file   • Highest education level: Not on file   Tobacco Use   • Smoking status: Never Smoker   • Smokeless tobacco: Never Used   Substance and Sexual Activity   • Alcohol use: No   • Drug use: No   • Sexual activity: Yes     Partners: Female     Birth control/protection: None       No Known Allergies    Current Outpatient Medications on File Prior to Visit   Medication Sig Dispense Refill   • albuterol (PROVENTIL HFA;VENTOLIN HFA) 108 (90 BASE) MCG/ACT inhaler 2 puffs prior to exercise and every 6 hours as needed. 6.7 g 11   • carvedilol (COREG) 6.25 MG tablet TAKE 1 TABLET BY MOUTH TWICE DAILY WITH MEALS 180 tablet 0   • diclofenac sodium (VOTAREN XR) 100 MG 24 hr tablet Take 100 mg by mouth Daily.      • oxyCODONE (oxyCONTIN) 10 MG 12 hr tablet Take 10 mg by mouth Every 12 (Twelve) Hours.     • oxyCODONE (ROXICODONE) 30 MG immediate release tablet Take 30 mg by mouth 2 (Two) Times a Day.     • [DISCONTINUED] buPROPion XL (WELLBUTRIN XL) 150 MG 24 hr tablet Take 1 tablet by mouth Daily. 30 tablet 5     No current facility-administered medications on file prior to visit.        Objective   Vitals:    05/20/19 1400   BP: 122/80   Pulse: 66   Temp: 98.5 °F (36.9 °C)   SpO2: 97%   Weight: 131 kg (288 lb)       Physical Exam   Constitutional: He appears well-developed and well-nourished.   Neck: Neck supple.   Neurological: He is alert.   Skin: Skin is warm and dry.   Psychiatric: He has a normal mood and affect. His behavior is normal.   Nursing note and vitals reviewed.      Assessment/Plan   Griffin was seen today for follow-up.    Diagnoses and all orders for this visit:    Anxiety  -     buPROPion XL (WELLBUTRIN XL) 300 MG 24 hr tablet; Take 1 tablet by mouth Daily.    denies SI;  Seek care immediately if thoughts of self harm  Ok increase dose         -Follow up: 6months and prn

## 2019-05-24 ENCOUNTER — OFFICE VISIT (OUTPATIENT)
Dept: FAMILY MEDICINE CLINIC | Facility: CLINIC | Age: 44
End: 2019-05-24

## 2019-05-24 VITALS
HEART RATE: 79 BPM | TEMPERATURE: 98.3 F | HEIGHT: 74 IN | SYSTOLIC BLOOD PRESSURE: 120 MMHG | WEIGHT: 275 LBS | OXYGEN SATURATION: 99 % | BODY MASS INDEX: 35.29 KG/M2 | DIASTOLIC BLOOD PRESSURE: 74 MMHG

## 2019-05-24 DIAGNOSIS — S20.369A TICK BITE OF CHEST WALL, INITIAL ENCOUNTER: Primary | ICD-10-CM
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
## 2019-05-24 PROCEDURE — 99213 OFFICE O/P EST LOW 20 MIN: CPT | Performed by: NURSE PRACTITIONER

## 2019-05-24 NOTE — PROGRESS NOTES
"Subjective   Griffin Samuels is a 44 y.o. male who presents c/o tick bite to left upper chest that is getting red and itchy.     History of Present Illness   Tick 7-8 days ago, attached not more than 24 hours, after cleaning out property. Applied heat to remove. A larger tick. Spot now red and itchy to L chest wall.   The following portions of the patient's history were reviewed and updated as appropriate: allergies, current medications, past family history, past medical history, past social history, past surgical history and problem list.    Review of Systems   Constitutional: Positive for fatigue. Negative for chills and fever.   Musculoskeletal: Positive for arthralgias (has chronic issues) and back pain. Negative for myalgias.   Hematological: Negative.    Psychiatric/Behavioral: Negative.      /74   Pulse 79   Temp 98.3 °F (36.8 °C) (Oral)   Ht 188 cm (74\")   Wt 125 kg (275 lb)   SpO2 99%   BMI 35.31 kg/m²     Objective   Physical Exam   Constitutional: He appears well-developed and well-nourished. No distress.   Cardiovascular: Normal rate.   Pulmonary/Chest: Effort normal.   Skin: Skin is warm and dry. Capillary refill takes less than 2 seconds. No rash noted. There is erythema (raised red 3cm x 2cm indurated lesion).   Psychiatric: He has a normal mood and affect. His behavior is normal. Judgment and thought content normal.   Nursing note and vitals reviewed.    Assessment/Plan   Problems Addressed this Visit     None      Visit Diagnoses     Tick bite of chest wall, initial encounter    -  Primary    Relevant Medications    triamcinolone (KENALOG) 0.1 % ointment    Other Relevant Orders    McNeil  (IgG / M)    Rickettsial Fever Group IgG / M    Lyme, IgM, Early Test / Reflex        Tick bite--as symptoms unclear, and outside the window for prophylaxis, draw titers. Call if increase symptoms, discussed warning signs. Treat itching locally with triamcinolone ointment. FU prn.        "

## 2019-05-29 LAB
B BURGDOR IGM SER IA-ACNC: <0.8 INDEX (ref 0–0.79)
R RICKETTSI IGG SER QL IA: POSITIVE
R RICKETTSI IGG TITR SER IF: NORMAL {TITER}
R RICKETTSI IGM SER-ACNC: 0.5 INDEX (ref 0–0.89)
RICK SF IGG TITR SER IF: NORMAL {TITER}
RICK SF IGM TITR SER IF: NORMAL {TITER}
RICK TYPHUS IGG TITR SER IF: NORMAL {TITER}
RICK TYPHUS IGM TITR SER IF: NORMAL {TITER}

## 2019-05-30 RX ORDER — DOXYCYCLINE HYCLATE 100 MG/1
100 TABLET, DELAYED RELEASE ORAL 2 TIMES DAILY
Qty: 14 TABLET | Refills: 0 | Status: SHIPPED | OUTPATIENT
Start: 2019-05-30 | End: 2019-09-09

## 2019-05-30 NOTE — PROGRESS NOTES
Please call the patient regarding his abnormal result. 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 Did he ever develop headache, widespread rash or fever? Likely would still treat with doxycycline 100mg BID #14 0rf FU immediate if any worsening symptoms. Pt. Sleeping in bed. Pt. Again refusing admission paperwork, PTA meds or to sign medication consents. Pt. Does report nausea improved since given prn med. Agrees to use tap bell if needing to get out of bed.

## 2019-06-04 ENCOUNTER — TELEPHONE (OUTPATIENT)
Dept: FAMILY MEDICINE CLINIC | Facility: CLINIC | Age: 44
End: 2019-06-04

## 2019-06-04 NOTE — TELEPHONE ENCOUNTER
Pt wants to know if he should do anything once antibiotics are complete for RMSF? He does not have a fever. He always has joint aches and is in pain management for this so unable to tell if anything is hurting than usual. He is curious if he needs to be retested or just assume it is now gone?

## 2019-08-23 DIAGNOSIS — I10 ESSENTIAL HYPERTENSION: ICD-10-CM

## 2019-08-23 RX ORDER — CARVEDILOL 6.25 MG/1
TABLET ORAL
Qty: 180 TABLET | Refills: 0 | Status: SHIPPED | OUTPATIENT
Start: 2019-08-23

## 2019-09-09 ENCOUNTER — OFFICE VISIT (OUTPATIENT)
Dept: FAMILY MEDICINE CLINIC | Facility: CLINIC | Age: 44
End: 2019-09-09

## 2019-09-09 VITALS
DIASTOLIC BLOOD PRESSURE: 86 MMHG | HEART RATE: 89 BPM | OXYGEN SATURATION: 99 % | BODY MASS INDEX: 36.32 KG/M2 | WEIGHT: 283 LBS | SYSTOLIC BLOOD PRESSURE: 126 MMHG | HEIGHT: 74 IN

## 2019-09-09 DIAGNOSIS — F41.9 ANXIETY: ICD-10-CM

## 2019-09-09 DIAGNOSIS — G89.4 CHRONIC PAIN SYNDROME: ICD-10-CM

## 2019-09-09 DIAGNOSIS — I10 ESSENTIAL HYPERTENSION: Primary | ICD-10-CM

## 2019-09-09 PROCEDURE — 99214 OFFICE O/P EST MOD 30 MIN: CPT | Performed by: FAMILY MEDICINE

## 2019-09-09 RX ORDER — DULOXETIN HYDROCHLORIDE 30 MG/1
30 CAPSULE, DELAYED RELEASE ORAL DAILY
Qty: 30 CAPSULE | Refills: 5 | Status: SHIPPED | OUTPATIENT
Start: 2019-09-09

## 2019-09-09 RX ORDER — CARVEDILOL PHOSPHATE 20 MG/1
20 CAPSULE, EXTENDED RELEASE ORAL DAILY
Qty: 30 CAPSULE | Refills: 5 | Status: SHIPPED | OUTPATIENT
Start: 2019-09-09

## 2019-09-09 NOTE — PROGRESS NOTES
Subjective   Griffin Samuels is a 44 y.o. male. Presents today for   Chief Complaint   Patient presents with   • Hypertension   • Anxiety       Since last seen has just had 3rd on 8/19/19 for back pain.  Reports bp running very high at pain mgmt.    Hypertension   This is a chronic problem. The current episode started more than 1 year ago. The problem is unchanged. The problem is uncontrolled. Associated symptoms include anxiety and headaches. Pertinent negatives include no chest pain, orthopnea, palpitations, peripheral edema, PND or shortness of breath. Risk factors for coronary artery disease include male gender and obesity. Past treatments include beta blockers. Current antihypertension treatment includes beta blockers. The current treatment provides moderate improvement. There are no compliance problems.    Anxiety   Presents for follow-up visit. Symptoms include excessive worry, insomnia, irritability, muscle tension and nervous/anxious behavior. Patient reports no chest pain, depressed mood, palpitations or shortness of breath. Symptoms occur most days. The severity of symptoms is severe. The quality of sleep is poor. Nighttime awakenings: several.     Compliance with medications is 0-25% (stopped wellbutrin as no relief.).       Review of Systems   Constitutional: Positive for irritability.   Respiratory: Negative for shortness of breath.    Cardiovascular: Negative for chest pain, palpitations, orthopnea and PND.   Neurological: Positive for headaches.   Psychiatric/Behavioral: The patient is nervous/anxious and has insomnia.        Patient Active Problem List   Diagnosis   • Hyperlipidemia   • Essential hypertension   • Osteoarthritis of multiple joints   • Hypogonadism in male   • Obesity due to excess calories   • History of arthrodesis   • Chronic low back pain       Social History     Socioeconomic History   • Marital status:      Spouse name: Not on file   • Number of children: Not on file   •  "Years of education: Not on file   • Highest education level: Not on file   Tobacco Use   • Smoking status: Never Smoker   • Smokeless tobacco: Never Used   Substance and Sexual Activity   • Alcohol use: No   • Drug use: No   • Sexual activity: Yes     Partners: Female     Birth control/protection: None       No Known Allergies    Current Outpatient Medications on File Prior to Visit   Medication Sig Dispense Refill   • carvedilol (COREG) 6.25 MG tablet TAKE 1 TABLET BY MOUTH TWICE DAILY WITH MEALS 180 tablet 0   • oxyCODONE (oxyCONTIN) 10 MG 12 hr tablet Take 10 mg by mouth Every 12 (Twelve) Hours.     • oxyCODONE (ROXICODONE) 30 MG immediate release tablet Take 30 mg by mouth 2 (Two) Times a Day.     • [DISCONTINUED] buPROPion XL (WELLBUTRIN XL) 300 MG 24 hr tablet Take 1 tablet by mouth Daily. 30 tablet 5   • [DISCONTINUED] doxycycline (DORYX) 100 MG enteric coated tablet Take 1 tablet by mouth 2 (Two) Times a Day. 14 tablet 0   • [DISCONTINUED] triamcinolone (KENALOG) 0.1 % ointment Apply  topically to the appropriate area as directed 2 (Two) Times a Day. 15 g 0     No current facility-administered medications on file prior to visit.        Objective   Vitals:    09/09/19 1514   BP: 126/86   BP Location: Left arm   Patient Position: Sitting   Cuff Size: Adult   Pulse: 89   SpO2: 99%   Weight: 128 kg (283 lb)   Height: 188 cm (74\")       Physical Exam   Constitutional: He appears well-developed and well-nourished.   HENT:   Head: Normocephalic and atraumatic.   Neck: Neck supple. No JVD present. No thyromegaly present.   Cardiovascular: Normal rate, regular rhythm and normal heart sounds. Exam reveals no gallop and no friction rub.   No murmur heard.  Pulmonary/Chest: Effort normal and breath sounds normal. No respiratory distress. He has no wheezes. He has no rales.   Abdominal: Soft. Bowel sounds are normal. He exhibits no distension. There is no tenderness. There is no rebound and no guarding. "   Musculoskeletal: He exhibits no edema.   Neurological: He is alert.   Skin: Skin is warm and dry.   Psychiatric: He has a normal mood and affect. His behavior is normal.   Nursing note and vitals reviewed.      Assessment/Plan   Griffin was seen today for hypertension and anxiety.    Diagnoses and all orders for this visit:    Essential hypertension  -     carvedilol CR (COREG CR) 20 MG 24 hr capsule; Take 1 capsule by mouth Daily.    Anxiety  -     DULoxetine (CYMBALTA) 30 MG capsule; Take 1 capsule by mouth Daily.    Chronic pain syndrome  -     DULoxetine (CYMBALTA) 30 MG capsule; Take 1 capsule by mouth Daily.    likely high bp (uncontrolled) as taking short acting all at once at night;  Will change to CR version  Duloxetine for anxiety and see if help pain levels.          -Follow up: 6 weeks and prn

## 2021-03-08 ENCOUNTER — HOSPITAL ENCOUNTER (OUTPATIENT)
Dept: CARDIOLOGY | Facility: HOSPITAL | Age: 46
Discharge: HOME | End: 2021-03-08

## 2021-03-08 ENCOUNTER — TRANSCRIBE ORDERS (OUTPATIENT)
Dept: ADMINISTRATIVE | Facility: HOSPITAL | Age: 46
End: 2021-03-08

## 2021-03-08 ENCOUNTER — LAB (OUTPATIENT)
Dept: LAB | Facility: HOSPITAL | Age: 46
End: 2021-03-08

## 2021-03-08 DIAGNOSIS — Z01.818 PRE-OP TESTING: ICD-10-CM

## 2021-03-08 DIAGNOSIS — Z01.818 PRE-OP TESTING: Primary | ICD-10-CM

## 2021-03-08 LAB
ALBUMIN SERPL-MCNC: 4.4 G/DL (ref 3.5–5.2)
ANION GAP SERPL CALCULATED.3IONS-SCNC: 10.1 MMOL/L (ref 5–15)
BASOPHILS # BLD AUTO: 0.08 10*3/MM3 (ref 0–0.2)
BASOPHILS NFR BLD AUTO: 1 % (ref 0–1.5)
BUN SERPL-MCNC: 12 MG/DL (ref 6–20)
BUN/CREAT SERPL: 14.5 (ref 7–25)
CALCIUM SPEC-SCNC: 9.3 MG/DL (ref 8.6–10.5)
CHLORIDE SERPL-SCNC: 99 MMOL/L (ref 98–107)
CO2 SERPL-SCNC: 29.9 MMOL/L (ref 22–29)
CREAT SERPL-MCNC: 0.83 MG/DL (ref 0.76–1.27)
DEPRECATED RDW RBC AUTO: 41.8 FL (ref 37–54)
EOSINOPHIL # BLD AUTO: 0.15 10*3/MM3 (ref 0–0.4)
EOSINOPHIL NFR BLD AUTO: 1.9 % (ref 0.3–6.2)
ERYTHROCYTE [DISTWIDTH] IN BLOOD BY AUTOMATED COUNT: 12.7 % (ref 12.3–15.4)
GFR SERPL CREATININE-BSD FRML MDRD: 99 ML/MIN/1.73
GLUCOSE SERPL-MCNC: 122 MG/DL (ref 65–99)
HBA1C MFR BLD: 5.9 % (ref 3.5–5.6)
HCT VFR BLD AUTO: 47.1 % (ref 37.5–51)
HGB BLD-MCNC: 15.5 G/DL (ref 13–17.7)
IMM GRANULOCYTES # BLD AUTO: 0.02 10*3/MM3 (ref 0–0.05)
IMM GRANULOCYTES NFR BLD AUTO: 0.3 % (ref 0–0.5)
LYMPHOCYTES # BLD AUTO: 2.89 10*3/MM3 (ref 0.7–3.1)
LYMPHOCYTES NFR BLD AUTO: 36.1 % (ref 19.6–45.3)
MCH RBC QN AUTO: 29.8 PG (ref 26.6–33)
MCHC RBC AUTO-ENTMCNC: 32.9 G/DL (ref 31.5–35.7)
MCV RBC AUTO: 90.6 FL (ref 79–97)
MONOCYTES # BLD AUTO: 0.64 10*3/MM3 (ref 0.1–0.9)
MONOCYTES NFR BLD AUTO: 8 % (ref 5–12)
NEUTROPHILS NFR BLD AUTO: 4.22 10*3/MM3 (ref 1.7–7)
NEUTROPHILS NFR BLD AUTO: 52.7 % (ref 42.7–76)
NRBC BLD AUTO-RTO: 0 /100 WBC (ref 0–0.2)
PLATELET # BLD AUTO: 282 10*3/MM3 (ref 140–450)
PMV BLD AUTO: 11.5 FL (ref 6–12)
POTASSIUM SERPL-SCNC: 4.6 MMOL/L (ref 3.5–5.2)
RBC # BLD AUTO: 5.2 10*6/MM3 (ref 4.14–5.8)
SODIUM SERPL-SCNC: 139 MMOL/L (ref 136–145)
WBC # BLD AUTO: 8 10*3/MM3 (ref 3.4–10.8)

## 2021-03-08 PROCEDURE — 36415 COLL VENOUS BLD VENIPUNCTURE: CPT

## 2021-03-08 PROCEDURE — 85025 COMPLETE CBC W/AUTO DIFF WBC: CPT

## 2021-03-08 PROCEDURE — 83036 HEMOGLOBIN GLYCOSYLATED A1C: CPT

## 2021-03-08 PROCEDURE — 82040 ASSAY OF SERUM ALBUMIN: CPT

## 2021-03-08 PROCEDURE — 93005 ELECTROCARDIOGRAM TRACING: CPT | Performed by: ORTHOPAEDIC SURGERY

## 2021-03-08 PROCEDURE — 80048 BASIC METABOLIC PNL TOTAL CA: CPT

## 2021-03-08 PROCEDURE — 93010 ELECTROCARDIOGRAM REPORT: CPT | Performed by: INTERNAL MEDICINE

## 2021-03-15 ENCOUNTER — LAB (OUTPATIENT)
Dept: LAB | Facility: HOSPITAL | Age: 46
End: 2021-03-15

## 2021-03-15 DIAGNOSIS — Z01.818 PRE-OP TESTING: ICD-10-CM

## 2021-03-15 LAB — SARS-COV-2 ORF1AB RESP QL NAA+PROBE: NOT DETECTED

## 2021-03-15 PROCEDURE — C9803 HOPD COVID-19 SPEC COLLECT: HCPCS

## 2021-03-15 PROCEDURE — U0004 COV-19 TEST NON-CDC HGH THRU: HCPCS

## 2021-03-16 LAB — QT INTERVAL: 401 MS

## 2023-06-14 ENCOUNTER — TRANSCRIBE ORDERS (OUTPATIENT)
Dept: ADMINISTRATIVE | Facility: HOSPITAL | Age: 48
End: 2023-06-14
Payer: COMMERCIAL

## 2023-06-14 DIAGNOSIS — R97.21 RISING PSA FOLLOWING TREATMENT FOR MALIGNANT NEOPLASM OF PROSTATE: ICD-10-CM

## 2023-06-14 DIAGNOSIS — C61 PROSTATE CANCER: Primary | ICD-10-CM
